# Patient Record
Sex: FEMALE | NOT HISPANIC OR LATINO | Employment: FULL TIME | URBAN - METROPOLITAN AREA
[De-identification: names, ages, dates, MRNs, and addresses within clinical notes are randomized per-mention and may not be internally consistent; named-entity substitution may affect disease eponyms.]

---

## 2018-05-30 ENCOUNTER — OFFICE VISIT (OUTPATIENT)
Dept: FAMILY MEDICINE CLINIC | Facility: CLINIC | Age: 48
End: 2018-05-30

## 2018-05-30 VITALS
HEIGHT: 64 IN | TEMPERATURE: 97.9 F | BODY MASS INDEX: 31.92 KG/M2 | RESPIRATION RATE: 16 BRPM | DIASTOLIC BLOOD PRESSURE: 80 MMHG | HEART RATE: 68 BPM | SYSTOLIC BLOOD PRESSURE: 120 MMHG | WEIGHT: 187 LBS

## 2018-05-30 DIAGNOSIS — E03.9 ACQUIRED HYPOTHYROIDISM: ICD-10-CM

## 2018-05-30 DIAGNOSIS — G56.22 ULNAR NEUROPATHY OF LEFT UPPER EXTREMITY: ICD-10-CM

## 2018-05-30 DIAGNOSIS — M77.02 GOLFER'S ELBOW, LEFT: Primary | ICD-10-CM

## 2018-05-30 PROCEDURE — 99213 OFFICE O/P EST LOW 20 MIN: CPT | Performed by: FAMILY MEDICINE

## 2018-05-30 RX ORDER — PREDNISONE 20 MG/1
40 TABLET ORAL DAILY
Qty: 10 TABLET | Refills: 0 | Status: SHIPPED | OUTPATIENT
Start: 2018-05-30 | End: 2020-09-29

## 2018-05-30 NOTE — PROGRESS NOTES
Subjective   Tonja Macias is a 47 y.o. female.     History of Present Illness     For the last 2-3 weeks she has had some soreness in her left elbow on the outside  Had some tingling on her pinkie and ring finger  Sometimes would wakt her up  She then played golf and it was worse, gripping makes this worse        Review of Systems   Constitutional: Negative.        Objective   Physical Exam   Constitutional: She appears well-developed and well-nourished. No distress.   Cardiovascular: Normal rate, regular rhythm and normal heart sounds.    Pulmonary/Chest: Effort normal and breath sounds normal.   Musculoskeletal:        Arms:  Neurological:   Negative tinnel and phalen on left   Psychiatric: She has a normal mood and affect. Her behavior is normal.   Nursing note and vitals reviewed.      Assessment/Plan   Tonja was seen today for arm pain.    Diagnoses and all orders for this visit:    Golfer's elbow, left    Ulnar neuropathy of left upper extremity  -     predniSONE (DELTASONE) 20 MG tablet; Take 2 tablets by mouth Daily.    Acquired hypothyroidism  -     TSH      Home stretches, tennis elbow brace, steroids and time.  Consider injection  Sounds like she may have some ulnar neuropathy.  Hoping the steroids calm this down, consider EMG in future INB

## 2018-05-31 LAB — TSH SERPL DL<=0.005 MIU/L-ACNC: 1.68 MIU/ML (ref 0.35–5.35)

## 2019-07-22 ENCOUNTER — TRANSCRIBE ORDERS (OUTPATIENT)
Dept: ADMINISTRATIVE | Facility: HOSPITAL | Age: 49
End: 2019-07-22

## 2019-07-22 DIAGNOSIS — Z12.31 VISIT FOR SCREENING MAMMOGRAM: Primary | ICD-10-CM

## 2019-08-27 ENCOUNTER — HOSPITAL ENCOUNTER (OUTPATIENT)
Dept: MAMMOGRAPHY | Facility: HOSPITAL | Age: 49
Discharge: HOME | End: 2019-08-27
Admitting: OBSTETRICS & GYNECOLOGY

## 2019-08-27 DIAGNOSIS — Z12.31 VISIT FOR SCREENING MAMMOGRAM: ICD-10-CM

## 2019-08-27 PROCEDURE — 77067 SCR MAMMO BI INCL CAD: CPT

## 2019-08-27 PROCEDURE — 77063 BREAST TOMOSYNTHESIS BI: CPT | Performed by: RADIOLOGY

## 2019-08-27 PROCEDURE — 77067 SCR MAMMO BI INCL CAD: CPT | Performed by: RADIOLOGY

## 2019-08-27 PROCEDURE — 77063 BREAST TOMOSYNTHESIS BI: CPT

## 2019-09-19 ENCOUNTER — HOSPITAL ENCOUNTER (OUTPATIENT)
Dept: MAMMOGRAPHY | Facility: HOSPITAL | Age: 49
Discharge: HOME | End: 2019-09-19
Admitting: RADIOLOGY

## 2019-09-19 DIAGNOSIS — R92.8 ABNORMAL MAMMOGRAM: ICD-10-CM

## 2019-09-19 PROCEDURE — 77065 DX MAMMO INCL CAD UNI: CPT

## 2019-09-19 PROCEDURE — G0279 TOMOSYNTHESIS, MAMMO: HCPCS

## 2019-09-19 PROCEDURE — 77061 BREAST TOMOSYNTHESIS UNI: CPT | Performed by: RADIOLOGY

## 2019-09-19 PROCEDURE — 77065 DX MAMMO INCL CAD UNI: CPT | Performed by: RADIOLOGY

## 2020-09-22 ENCOUNTER — TRANSCRIBE ORDERS (OUTPATIENT)
Dept: ADMINISTRATIVE | Facility: HOSPITAL | Age: 50
End: 2020-09-22

## 2020-09-22 DIAGNOSIS — Z12.31 VISIT FOR SCREENING MAMMOGRAM: Primary | ICD-10-CM

## 2020-09-29 ENCOUNTER — HOSPITAL ENCOUNTER (OUTPATIENT)
Dept: MAMMOGRAPHY | Facility: HOSPITAL | Age: 50
Discharge: HOME | End: 2020-09-29
Admitting: OBSTETRICS & GYNECOLOGY

## 2020-09-29 ENCOUNTER — OFFICE VISIT (OUTPATIENT)
Dept: OBSTETRICS AND GYNECOLOGY | Facility: CLINIC | Age: 50
End: 2020-09-29

## 2020-09-29 VITALS
BODY MASS INDEX: 32.27 KG/M2 | SYSTOLIC BLOOD PRESSURE: 120 MMHG | DIASTOLIC BLOOD PRESSURE: 74 MMHG | WEIGHT: 189 LBS | TEMPERATURE: 97.3 F | HEIGHT: 64 IN

## 2020-09-29 DIAGNOSIS — Z12.31 VISIT FOR SCREENING MAMMOGRAM: ICD-10-CM

## 2020-09-29 DIAGNOSIS — Z00.00 ANNUAL PHYSICAL EXAM: Primary | ICD-10-CM

## 2020-09-29 PROCEDURE — 77063 BREAST TOMOSYNTHESIS BI: CPT

## 2020-09-29 PROCEDURE — 77067 SCR MAMMO BI INCL CAD: CPT

## 2020-09-29 PROCEDURE — 77067 SCR MAMMO BI INCL CAD: CPT | Performed by: RADIOLOGY

## 2020-09-29 PROCEDURE — 99396 PREV VISIT EST AGE 40-64: CPT | Performed by: NURSE PRACTITIONER

## 2020-09-29 PROCEDURE — 77063 BREAST TOMOSYNTHESIS BI: CPT | Performed by: RADIOLOGY

## 2020-09-29 NOTE — PROGRESS NOTES
GYN Annual Exam     CC - Here for annual exam.        HPI  Tonja Macias is a 49 y.o. female, , who presents for annual well woman exam. Patient's last menstrual period was 2020 (exact date)..  Periods are regular every 25-35 days, lasting 3 days. .  Dysmenorrhea:none.  Patient reports problems with: none.  Partner Status: Marital Status: .  New Partners since last visit: no.  Desires STD Screening: no. The patient had an annual exam one year ago. Her last pap smear was one year ago and negative. She had her screening mammogram this morning, results pending. Her periods are regular and light flow. She uses vasectomy for birth control. She has no complaints today.    Additional OB/GYN History   Current contraception: contraceptive methods: Vasectomy   Desires to: continue contraception  Last Pap : 2019 negative  Last Completed Pap Smear       Status Date      PAP SMEAR No completions recorded        History of abnormal Pap smear: no  Family history of uterine, colon, breast, or ovarian cancer: no  Performs monthly Self-Breast Exam: yes  Last mammogram: 2020 pending  Last Completed Mammogram     Patient has no health maintenance due at this time         Exercises Regularly: yes  Feelings of Anxiety or Depression: no  Tobacco Usage?: No   OB History        1    Para   1    Term   1            AB        Living   2       SAB        TAB        Ectopic        Molar        Multiple   1    Live Births   1                Health Maintenance   Topic Date Due   • Annual Gynecologic Pelvic and Breast Exam  1971   • COLONOSCOPY  1971   • ANNUAL PHYSICAL  1974   • TDAP/TD VACCINES (1 - Tdap) 1990   • HEPATITIS C SCREENING  2016   • PAP SMEAR  2016   • INFLUENZA VACCINE  2020   • Pneumococcal Vaccine 0-64  Aged Out       The additional following portions of the patient's history were reviewed and updated as appropriate: allergies, current  "medications, past family history, past medical history, past social history, past surgical history and problem list.    Review of Systems   Constitutional: Negative.    Gastrointestinal: Negative.    Genitourinary: Negative.    Psychiatric/Behavioral: Negative.      All other systems reviewed and are negative.     I have reviewed and agree with the HPI, ROS, and historical information as entered above. Verena Hardin, APRN    Objective   /74   Temp 97.3 °F (36.3 °C)   Ht 162.6 cm (64\")   Wt 85.7 kg (189 lb)   LMP 09/17/2020 (Exact Date) Comment: denies pregnancy  Breastfeeding No   BMI 32.44 kg/m²     Physical Exam  Vitals signs and nursing note reviewed. Exam conducted with a chaperone present.   Constitutional:       Appearance: Normal appearance.   Cardiovascular:      Rate and Rhythm: Normal rate and regular rhythm.   Chest:      Breasts:         Right: Normal.         Left: Normal.      Comments: Had breast reduction 11/2019  Genitourinary:     Vagina: Normal.      Cervix: Normal.      Uterus: Normal.       Adnexa: Right adnexa normal and left adnexa normal.      Rectum: Normal.   Neurological:      Mental Status: She is alert.            Assessment and Plan    Problem List Items Addressed This Visit     None      Visit Diagnoses     Annual physical exam    -  Primary    Relevant Orders    Pap IG, Rfx HPV ASCU          1. GYN annual well woman exam.   2. Reviewed monthly self breast exams.  Instructed to call with lumps, pain, or breast discharge.    3. Recommended use of Vitamin D replacement and getting adequate calcium in her diet. (1500mg)  4. Reviewed exercise as a preventative health measures.   5. Reccommended Flu Vaccine in Fall of each year.  6. RTC in 1 year or PRN with problems.   7. She had screening mammogram this am    Verena Hardin, APRN  09/29/2020  "

## 2020-10-13 DIAGNOSIS — Z00.00 ANNUAL PHYSICAL EXAM: ICD-10-CM

## 2021-09-07 ENCOUNTER — TRANSCRIBE ORDERS (OUTPATIENT)
Dept: ADMINISTRATIVE | Facility: HOSPITAL | Age: 51
End: 2021-09-07

## 2021-09-07 DIAGNOSIS — Z12.31 VISIT FOR SCREENING MAMMOGRAM: Primary | ICD-10-CM

## 2021-11-01 ENCOUNTER — HOSPITAL ENCOUNTER (OUTPATIENT)
Dept: MAMMOGRAPHY | Facility: HOSPITAL | Age: 51
Discharge: HOME | End: 2021-11-01
Admitting: OBSTETRICS & GYNECOLOGY

## 2021-11-01 DIAGNOSIS — Z12.31 VISIT FOR SCREENING MAMMOGRAM: ICD-10-CM

## 2021-11-01 PROCEDURE — 77063 BREAST TOMOSYNTHESIS BI: CPT | Performed by: RADIOLOGY

## 2021-11-01 PROCEDURE — 77067 SCR MAMMO BI INCL CAD: CPT

## 2021-11-01 PROCEDURE — 77067 SCR MAMMO BI INCL CAD: CPT | Performed by: RADIOLOGY

## 2021-11-01 PROCEDURE — 77063 BREAST TOMOSYNTHESIS BI: CPT

## 2023-09-28 ENCOUNTER — TRANSCRIBE ORDERS (OUTPATIENT)
Dept: ADMINISTRATIVE | Facility: HOSPITAL | Age: 53
End: 2023-09-28
Payer: COMMERCIAL

## 2023-09-28 DIAGNOSIS — Z12.31 SCREENING MAMMOGRAM FOR BREAST CANCER: Primary | ICD-10-CM

## 2023-10-27 ENCOUNTER — HOSPITAL ENCOUNTER (OUTPATIENT)
Dept: MAMMOGRAPHY | Facility: HOSPITAL | Age: 53
Discharge: HOME | End: 2023-10-27
Admitting: OBSTETRICS & GYNECOLOGY
Payer: COMMERCIAL

## 2023-10-27 DIAGNOSIS — Z12.31 SCREENING MAMMOGRAM FOR BREAST CANCER: ICD-10-CM

## 2023-10-27 PROCEDURE — 77063 BREAST TOMOSYNTHESIS BI: CPT

## 2023-10-27 PROCEDURE — 77067 SCR MAMMO BI INCL CAD: CPT

## 2024-09-16 ENCOUNTER — TRANSCRIBE ORDERS (OUTPATIENT)
Dept: OBSTETRICS AND GYNECOLOGY | Facility: CLINIC | Age: 54
End: 2024-09-16
Payer: COMMERCIAL

## 2024-09-16 DIAGNOSIS — Z12.31 ENCOUNTER FOR SCREENING MAMMOGRAM FOR BREAST CANCER: Primary | ICD-10-CM

## 2024-10-15 LAB
NCCN CRITERIA FLAG: NORMAL
TYRER CUZICK SCORE: 8.4

## 2024-10-28 ENCOUNTER — HOSPITAL ENCOUNTER (OUTPATIENT)
Dept: MAMMOGRAPHY | Facility: HOSPITAL | Age: 54
Discharge: HOME | End: 2024-10-28
Payer: COMMERCIAL

## 2024-10-28 DIAGNOSIS — Z12.31 ENCOUNTER FOR SCREENING MAMMOGRAM FOR BREAST CANCER: ICD-10-CM

## 2024-11-19 ENCOUNTER — OFFICE VISIT (OUTPATIENT)
Dept: OBSTETRICS AND GYNECOLOGY | Facility: CLINIC | Age: 54
End: 2024-11-19
Payer: COMMERCIAL

## 2024-11-19 VITALS
SYSTOLIC BLOOD PRESSURE: 118 MMHG | BODY MASS INDEX: 26.53 KG/M2 | WEIGHT: 155.4 LBS | HEIGHT: 64 IN | DIASTOLIC BLOOD PRESSURE: 70 MMHG

## 2024-11-19 DIAGNOSIS — N92.0 MENORRHAGIA WITH REGULAR CYCLE: ICD-10-CM

## 2024-11-19 DIAGNOSIS — N92.1 BREAKTHROUGH BLEEDING: ICD-10-CM

## 2024-11-19 DIAGNOSIS — Z01.419 WOMEN'S ANNUAL ROUTINE GYNECOLOGICAL EXAMINATION: Primary | ICD-10-CM

## 2024-11-19 RX ORDER — DROSPIRENONE 4 MG/1
1 TABLET, FILM COATED ORAL DAILY
Qty: 84 TABLET | Refills: 4 | Status: SHIPPED | OUTPATIENT
Start: 2024-11-19

## 2024-11-19 NOTE — PROGRESS NOTES
"          Chief Complaint   Patient presents with    Menstrual Problem   Annual gyn exam       Subjective   HPI  Tonja Macias is a 54 y.o. female, , Patient's last menstrual period was 2024 (approximate).. She presents for initial evaluation of irregular periods. She reports her cycles occur every 25-35 days , lasting 3-4 days. The flow varies.  She reports at time, she will have to change a pad/tampon every hour.  She reports that following her \"normal\" period, she will start spotting two days later for 2-3 days.  The menstrual problem began  3-4 months ago. Prior to today's visit, the patient has has not been evaluated. The patient reports additional symptoms as  decreased energy .      US was not done today. Patient reports that she had a ct done x 1 year ago for screening, and it showed fibroids. Her pcp told her it was nothing to be concerned with unless she began to have changes in her periods. Vasectomy for contraception.    Thromboembolic Disease: none  History of hypertension: no  History of migraines: no  Tobacco Usage?: No     Additional OB/GYN History   Last Pap : - negative  Last Completed Pap Smear       This patient has no relevant Health Maintenance data.              Current Outpatient Medications:     Drospirenone (Slynd) 4 MG tablet, Take 1 tablet by mouth Daily., Disp: 84 tablet, Rfl: 4     Past Medical History:   Diagnosis Date    A-fib     Screening breast examination     self admits        Past Surgical History:   Procedure Laterality Date    ABDOMINOPLASTY  2024    APPENDECTOMY       SECTION      REDUCTION MAMMAPLASTY  2019         The additional following portions of the patient's history were reviewed and updated as appropriate: allergies, current medications, past family history, past medical history, past social history, past surgical history, and problem list.    Review of Systems   Constitutional:  Positive for fatigue.   Respiratory: Negative.   " "  Cardiovascular: Negative.    Gastrointestinal: Negative.    Genitourinary:  Positive for menstrual problem.       I have reviewed and agree with the HPI, ROS, and historical information as entered above. Kenyetta Lillykman, APRN      Objective   /70   Ht 162.6 cm (64\")   Wt 70.5 kg (155 lb 6.4 oz)   LMP 11/12/2024 (Approximate)   BMI 26.67 kg/m²     Physical Exam  Constitutional:       Appearance: Normal appearance.   Neck:      Thyroid: No thyroid mass or thyromegaly.   Pulmonary:      Effort: Pulmonary effort is normal.   Chest:      Chest wall: No mass.   Breasts:     Right: Normal. No inverted nipple, mass, nipple discharge or skin change.      Left: Normal. No inverted nipple, mass, nipple discharge or skin change.   Abdominal:      General: There is no distension.      Palpations: Abdomen is soft. There is no mass.      Tenderness: There is no abdominal tenderness.      Hernia: No hernia is present.   Genitourinary:     General: Normal vulva.      Labia:         Right: No rash.         Left: No rash.       Vagina: Normal.      Cervix: Friability present. No cervical motion tenderness or lesion.      Uterus: Normal.       Adnexa: Right adnexa normal and left adnexa normal.        Right: No mass or tenderness.          Left: No mass or tenderness.              Comments: Possible polyp posterior cervix, not pedunculated, flush with cervix so unable to easily remove.   Neurological:      Mental Status: She is alert.         Assessment & Plan     Assessment     Problem List Items Addressed This Visit          Genitourinary and Reproductive     Menorrhagia with regular cycle    Overview     Incidental finding of fibroids noted on CT 2023. No issue until last 4 months or so with 1 day of heavy bleeding. Known iron deficiency anemia. Thyroid was normal with PCP 6 months ago. RTO U/S. Will start slynd         Relevant Orders    US Non-ob Transvaginal    Breakthrough bleeding    Overview     X 3-4 months for " approx 1 week following menses         Relevant Orders    US Non-ob Transvaginal     Other Visit Diagnoses       Women's annual routine gynecological examination    -  Primary          Possible polyp noted, unable to remove using ring forcep, will have OP evaluate at next visit.     Plan     Call for heavy bleeding  Return to office for U/S for Eval  Start slynd  Follow Up: Return in about 2 weeks (around 12/3/2024) for U/S Susan BTB, fibroids, possible cervical polyp.        Kenyetta Banks, APRN  11/19/2024

## 2024-11-20 LAB — REF LAB TEST METHOD: NORMAL

## 2024-12-03 ENCOUNTER — OFFICE VISIT (OUTPATIENT)
Dept: OBSTETRICS AND GYNECOLOGY | Facility: CLINIC | Age: 54
End: 2024-12-03
Payer: COMMERCIAL

## 2024-12-03 VITALS
WEIGHT: 155 LBS | RESPIRATION RATE: 18 BRPM | SYSTOLIC BLOOD PRESSURE: 120 MMHG | BODY MASS INDEX: 26.46 KG/M2 | DIASTOLIC BLOOD PRESSURE: 84 MMHG | HEIGHT: 64 IN

## 2024-12-03 DIAGNOSIS — N92.0 MENORRHAGIA WITH REGULAR CYCLE: Primary | ICD-10-CM

## 2024-12-03 DIAGNOSIS — D62 ANEMIA DUE TO ACUTE BLOOD LOSS: ICD-10-CM

## 2024-12-03 DIAGNOSIS — D25.1 FIBROIDS, INTRAMURAL: ICD-10-CM

## 2024-12-03 PROCEDURE — 99214 OFFICE O/P EST MOD 30 MIN: CPT | Performed by: OBSTETRICS & GYNECOLOGY

## 2024-12-03 RX ORDER — TRANEXAMIC ACID 650 MG/1
TABLET ORAL
Qty: 30 TABLET | Refills: 12 | Status: SHIPPED | OUTPATIENT
Start: 2024-12-03

## 2024-12-03 RX ORDER — FERRIC MALTOL 30 MG/1
30 CAPSULE ORAL DAILY
Qty: 60 CAPSULE | Refills: 4 | Status: SHIPPED | OUTPATIENT
Start: 2024-12-03

## 2024-12-03 NOTE — PROGRESS NOTES
Chief Complaint   Patient presents with    abnormal uterine bleeding    Fibroids       Subjective   HPI  Tonja Mcaias is a 54 y.o. female, . Her last LMP was Patient's last menstrual period was 2024 (approximate).. who presents for follow up on fibroids, abnormal uterine bleeding.      At her last visit she was treated with  slynd . Since then she reports she is not aware if her symptoms/issue have improved due to not having a period yet.. The patient reports additional symptoms as none.        Additional OB/GYN History     Last Pap :   Last Completed Pap Smear            PAP SMEAR (Every 3 Years) Next due on 2027  LIQUID-BASED PAP SMEAR WITH HPV GENOTYPING REGARDLESS OF INTERPRETATION (KATHY,COR,MAD)                    Last mammogram:   Last Completed Mammogram            Scheduled - MAMMOGRAM (Every 2 Years) Scheduled for 2025      10/27/2023  Mammo Screening Digital Tomosynthesis Bilateral With CAD    2021  Mammo Screening Digital Tomosynthesis Bilateral With CAD    2020  Mammo Screening Digital Tomosynthesis Bilateral With CAD    2019  Mammo Diagnostic Digital Tomosynthesis Right With CAD    2019  Mammo Screening Digital Tomosynthesis Bilateral With CAD    Only the first 5 history entries have been loaded, but more history exists.                    Tobacco Usage?: No   OB History          1    Para   1    Term   1            AB        Living   2         SAB        IAB        Ectopic        Molar        Multiple   1    Live Births   2                  Current Outpatient Medications:     Drospirenone (Slynd) 4 MG tablet, Take 1 tablet by mouth Daily., Disp: 84 tablet, Rfl: 4    Ferric Maltol (ACCRUFeR) 30 MG capsule, Take 1 capsule by mouth Daily., Disp: 60 capsule, Rfl: 4    Tranexamic Acid (Lysteda) 650 MG tablet, 2 tablets by mouth 3 times daily for 5 days, Disp: 30 tablet, Rfl: 12     Past Medical History:  "  Diagnosis Date    A-fib     Screening breast examination     self admits        Past Surgical History:   Procedure Laterality Date    ABDOMINOPLASTY  2024    APPENDECTOMY       SECTION      REDUCTION MAMMAPLASTY  2019    WISDOM TOOTH EXTRACTION         The additional following portions of the patient's history were reviewed and updated as appropriate: allergies, current medications, past family history, past medical history, past social history, past surgical history, and problem list.    Review of Systems    I have reviewed and agree with the HPI, ROS, and historical information as entered above. Nathaniel Davis MD      Objective   /84   Resp 18   Ht 162.6 cm (64.02\")   Wt 70.3 kg (155 lb)   LMP 2024 (Approximate)   BMI 26.59 kg/m²     Physical Exam not done     Assessment & Plan     Assessment     Problem List Items Addressed This Visit          Genitourinary and Reproductive     Menorrhagia with regular cycle - Primary    Overview     Incidental finding of fibroids noted on CT . No issue until last 4 months or so with 1 day of heavy bleeding. Known iron deficiency anemia. Thyroid was normal with PCP 6 months ago. RTO U/S. Will start slynd         Relevant Medications    Tranexamic Acid (Lysteda) 650 MG tablet     Other Visit Diagnoses       Fibroids, intramural        Anemia due to acute blood loss        Relevant Medications    Ferric Maltol (ACCRUFeR) 30 MG capsule            Fibroids  Anemia     Plan     All questions answered.  Will add Accrufer and Lysteda and reasses in 2 months   Return in about 9 weeks (around 2025) for us then .        Nathaniel Davis MD  2024   "

## 2024-12-05 ENCOUNTER — TELEPHONE (OUTPATIENT)
Dept: OBSTETRICS AND GYNECOLOGY | Facility: CLINIC | Age: 54
End: 2024-12-05
Payer: COMMERCIAL

## 2024-12-05 NOTE — TELEPHONE ENCOUNTER
The PA for Accrufer 30mg capsules was denied. - [facility] is going to reach out to the pt to offer a cash price. Her [employer] wanted chart notes or paid claims of High potency iron, ferrous sulfate

## 2025-02-04 ENCOUNTER — TELEPHONE (OUTPATIENT)
Dept: OBSTETRICS AND GYNECOLOGY | Facility: CLINIC | Age: 55
End: 2025-02-04
Payer: COMMERCIAL

## 2025-02-04 ENCOUNTER — OFFICE VISIT (OUTPATIENT)
Dept: OBSTETRICS AND GYNECOLOGY | Facility: CLINIC | Age: 55
End: 2025-02-04
Payer: COMMERCIAL

## 2025-02-04 VITALS
DIASTOLIC BLOOD PRESSURE: 80 MMHG | HEIGHT: 64 IN | WEIGHT: 151 LBS | SYSTOLIC BLOOD PRESSURE: 110 MMHG | BODY MASS INDEX: 25.78 KG/M2

## 2025-02-04 DIAGNOSIS — D25.2 INTRAMURAL AND SUBSEROUS LEIOMYOMA OF UTERUS: ICD-10-CM

## 2025-02-04 DIAGNOSIS — N93.9 ABNORMAL UTERINE BLEEDING (AUB): Primary | ICD-10-CM

## 2025-02-04 DIAGNOSIS — D25.1 INTRAMURAL AND SUBSEROUS LEIOMYOMA OF UTERUS: ICD-10-CM

## 2025-02-04 RX ORDER — MEDROXYPROGESTERONE ACETATE 10 MG
TABLET ORAL
Qty: 42 TABLET | Refills: 3 | Status: SHIPPED | OUTPATIENT
Start: 2025-02-04

## 2025-02-04 NOTE — PROGRESS NOTES
Chief Complaint   Patient presents with    endometrial biopsy       Subjective   HPI  Tonja Macias is a 54 y.o. female, . Her last LMP was No LMP recorded. Patient is perimenopausal.. who presents for follow up on breakthrough bleeding.      At her last visit she was treated with  none . Since then she reports her symptoms/issue has remained unchanged. The patient reports additional symptoms as none.        Additional OB/GYN History     Last Pap :   Last Completed Pap Smear            PAP SMEAR (Every 3 Years) Next due on 2027  LIQUID-BASED PAP SMEAR WITH HPV GENOTYPING REGARDLESS OF INTERPRETATION (KATHY,COR,MAD)                    Last mammogram:   Last Completed Mammogram            Ordered - MAMMOGRAM (Every 2 Years) Ordered on 10/28/2024      10/27/2023  Mammo Screening Digital Tomosynthesis Bilateral With CAD    2021  Mammo Screening Digital Tomosynthesis Bilateral With CAD    2020  Mammo Screening Digital Tomosynthesis Bilateral With CAD    2019  Mammo Diagnostic Digital Tomosynthesis Right With CAD    2019  Mammo Screening Digital Tomosynthesis Bilateral With CAD    Only the first 5 history entries have been loaded, but more history exists.                    Tobacco Usage?: No   OB History          1    Para   1    Term   1            AB        Living   2         SAB        IAB        Ectopic        Molar        Multiple   1    Live Births   2                  Current Outpatient Medications:     Drospirenone (Slynd) 4 MG tablet, Take 1 tablet by mouth Daily., Disp: 84 tablet, Rfl: 4    Ferric Maltol (ACCRUFeR) 30 MG capsule, Take 1 capsule by mouth Daily., Disp: 60 capsule, Rfl: 4    Tranexamic Acid (Lysteda) 650 MG tablet, 2 tablets by mouth 3 times daily for 5 days, Disp: 30 tablet, Rfl: 12     Past Medical History:   Diagnosis Date    A-fib     Screening breast examination     self admits        Past Surgical History:  "  Procedure Laterality Date    ABDOMINOPLASTY  2024    APPENDECTOMY       SECTION      REDUCTION MAMMAPLASTY  2019    WISDOM TOOTH EXTRACTION         The additional following portions of the patient's history were reviewed and updated as appropriate: allergies and current medications.    Review of Systems    I have reviewed and agree with the HPI, ROS, and historical information as entered above. Nathaniel Davis MD      Objective   /80   Ht 162.6 cm (64.02\")   Wt 68.5 kg (151 lb)   BMI 25.90 kg/m²     Physical Exam  Vitals and nursing note reviewed. Exam conducted with a chaperone present.   Constitutional:       Appearance: She is well-developed.   HENT:      Head: Normocephalic and atraumatic.   Neck:      Thyroid: No thyroid mass or thyromegaly.   Cardiovascular:      Rate and Rhythm: Normal rate and regular rhythm.      Heart sounds: No murmur heard.  Pulmonary:      Effort: Pulmonary effort is normal. No retractions.      Breath sounds: Normal breath sounds. No wheezing, rhonchi or rales.   Chest:      Chest wall: No mass or tenderness.   Breasts:     Right: Normal. No mass, nipple discharge, skin change or tenderness.      Left: Normal. No mass, nipple discharge, skin change or tenderness.   Abdominal:      General: Bowel sounds are normal.      Palpations: Abdomen is soft. Abdomen is not rigid. There is no mass.      Tenderness: There is no abdominal tenderness. There is no guarding.      Hernia: No hernia is present. There is no hernia in the left inguinal area.   Genitourinary:     Labia:         Right: No rash, tenderness or lesion.         Left: No rash, tenderness or lesion.       Vagina: Normal. Bleeding present. No vaginal discharge or lesions.      Cervix: No cervical motion tenderness, discharge, lesion or cervical bleeding.      Uterus: Normal. Not enlarged, not fixed and not tender.       Adnexa:         Right: No mass or tenderness.          Left: No mass or tenderness.   "      Rectum: No external hemorrhoid.   Musculoskeletal:      Cervical back: Normal range of motion. No muscular tenderness.   Neurological:      Mental Status: She is alert and oriented to person, place, and time.   Psychiatric:         Behavior: Behavior normal.         Assessment & Plan     Assessment     Problem List Items Addressed This Visit    None  Visit Diagnoses       Abnormal uterine bleeding (AUB)    -  Primary    Intramural and subserous leiomyoma of uterus                Patient has history of fibroids.  Has been on Slynd for the last 2 months however her periods of gone from 5 days and regular monthly to 11 to 12 days and heavier.  Endometrial biopsy was gotten today to 9 cm.  There is 3 fibroids they are relatively stable since December.  The largest is 3.5 cm.  None seem to impinge on the endometrial cavity.    Plan     All questions answered.  Patient is going to stop slynd and we will start Provera 10 mg to take 3 weeks out of each month and have 1 week off for her menses.  She is already on iron.  She does not want to take Lysteda due to a history of atrial fibs.  We offered her an IUD, and ablation is the possible fix as well.  Hysterectomy was offered but she does not want to do that at this time.  We are going to draw labs today to see if she is close to menopause and check her blood level.  I will have her come back in a month.  Return in about 1 month (around 3/4/2025) for Recheck.        Nathaniel Davis MD  02/04/2025

## 2025-02-04 NOTE — PROGRESS NOTES
"     Gynecologic Procedure Note        Endometrial Biopsy      Indications: Tonja Macias is a 54 y.o. , who presents today for evaluation of Menorrhagia. As part of the evaluation, an endometrial biopsy was recommended.  The patient was noted to have Menorrhagia.  Her LMP is No LMP recorded. Patient is perimenopausal. . After being presented with the risk, benefits, and specific detail of the procedure, the patient wished to proceed.  Written consent was obtained from patient.   Urine pregnancy test was Not indicated. Patient does not have an allergy to betadine or shellfish.     Procedure Details     Time out: immediate members of the procedure team and patient agree to the following: correct patient, correct site, correct procedure to be performed. Nathaniel Davis MD      The patient was placed on the table in the dorsal lithotomy position.  She was draped in the appropriate manner.  A speculum was placed in the vagina.  The cervix was visualized and prepped with Betadine.  A tenaculum was placed on the anterior lip of the cervix for traction.  A small plastic 5 mm Pipelle syringe curette was inserted into the cervical canal.  The uterus was sounded to 9 cm's.  A vigorous four quadrant biopsy was performed, removing a medium amount of tissue.  The tissue was placed in Formalin and sent to Pathology.  The patient tolerated the procedure adequately and she reported moderate cramping.  The tenaculum was removed from the cervix and the speculum was removed.  The patient was observed for 10 minutes.           Complications: none.     Procedures endometrial bx     Review of Systems  /80   Ht 162.6 cm (64.02\")   Wt 68.5 kg (151 lb)   BMI 25.90 kg/m²       Plan:  No orders of the defined types were placed in this encounter.      Problem List Items Addressed This Visit    None  Visit Diagnoses       Abnormal uterine bleeding (AUB)    -  Primary    Intramural and subserous leiomyoma of uterus          "           Instructions  Call the office in 5 business days for biopsy results.  Patient instructed to call the office if develops a fever of 100.4 or greater, vaginal bleeding heavier than a period, foul vaginal discharge or pain.     Natahniel Davis MD  02/04/2025

## 2025-02-04 NOTE — TELEPHONE ENCOUNTER
Provider:  DR MALIK    Caller:LAVELL QIU    Phone Number: [phone]     Reason for Call: PATIENT STARTED HER CYCLE LAST NIGHT AND HAS US TODAY IS WONDERING IF SHE CAN STILL COME OR NEEDS TO R/S//HUB COULDN'T REACH OFFICE//SENDING HIGH PRIORITY APPT IS AT 9

## 2025-02-05 ENCOUNTER — TELEPHONE (OUTPATIENT)
Dept: OBSTETRICS AND GYNECOLOGY | Facility: CLINIC | Age: 55
End: 2025-02-05
Payer: COMMERCIAL

## 2025-02-05 LAB
ERYTHROCYTE [DISTWIDTH] IN BLOOD BY AUTOMATED COUNT: 12.2 % (ref 12.3–15.4)
ESTRADIOL SERPL-MCNC: 79.3 PG/ML
FSH SERPL-ACNC: 12.1 MIU/ML
HCT VFR BLD AUTO: 41.4 % (ref 34–46.6)
HGB BLD-MCNC: 13.4 G/DL (ref 12–15.9)
MCH RBC QN AUTO: 30.1 PG (ref 26.6–33)
MCHC RBC AUTO-ENTMCNC: 32.4 G/DL (ref 31.5–35.7)
MCV RBC AUTO: 93 FL (ref 79–97)
PLATELET # BLD AUTO: 241 10*3/MM3 (ref 140–450)
RBC # BLD AUTO: 4.45 10*6/MM3 (ref 3.77–5.28)
REF LAB TEST METHOD: NORMAL
T4 FREE SERPL-MCNC: 1.51 NG/DL (ref 0.92–1.68)
TSH SERPL DL<=0.005 MIU/L-ACNC: 3.33 UIU/ML (ref 0.27–4.2)
WBC # BLD AUTO: 6.4 10*3/MM3 (ref 3.4–10.8)

## 2025-02-05 NOTE — TELEPHONE ENCOUNTER
Caller: Tonja Macias    Relationship: Self    Best call back number: [phone]    What is the best time to reach you: ANYTIME     Do you know the name of the person who called: DEYANIRA

## 2025-03-04 ENCOUNTER — OFFICE VISIT (OUTPATIENT)
Dept: OBSTETRICS AND GYNECOLOGY | Facility: CLINIC | Age: 55
End: 2025-03-04
Payer: COMMERCIAL

## 2025-03-04 VITALS
RESPIRATION RATE: 18 BRPM | HEIGHT: 64 IN | DIASTOLIC BLOOD PRESSURE: 80 MMHG | SYSTOLIC BLOOD PRESSURE: 120 MMHG | WEIGHT: 158 LBS | BODY MASS INDEX: 26.98 KG/M2

## 2025-03-04 DIAGNOSIS — N94.6 DYSMENORRHEA: Primary | ICD-10-CM

## 2025-03-04 DIAGNOSIS — N93.9 ABNORMAL UTERINE BLEEDING (AUB): ICD-10-CM

## 2025-03-04 DIAGNOSIS — D25.1 FIBROIDS, INTRAMURAL: ICD-10-CM

## 2025-03-04 PROCEDURE — 99212 OFFICE O/P EST SF 10 MIN: CPT | Performed by: OBSTETRICS & GYNECOLOGY

## 2025-03-04 RX ORDER — ACARBOSE 100 MG/1
TABLET ORAL
COMMUNITY
Start: 2025-01-16

## 2025-03-04 NOTE — PROGRESS NOTES
Chief Complaint   Patient presents with    Menorrhagia       Subjective   HPI  Tonja Macias is a 54 y.o. female, . Her last LMP was Patient's last menstrual period was 2025 (exact date).. who presents for follow up on menorrhagia.  Patient states she has had 2 periods in 21 days that lasted 5-7 days each.    At her last visit she was treated with Provera and had a negative EMB. Since then she reports her symptoms/issue has remained unchanged. The patient reports additional symptoms as none.        Additional OB/GYN History     Last Pap :   Last Completed Pap Smear            PAP SMEAR (Every 3 Years) Next due on 2027  LIQUID-BASED PAP SMEAR WITH HPV GENOTYPING REGARDLESS OF INTERPRETATION ([facility])                    Last mammogram:   Last Completed Mammogram            Ordered - MAMMOGRAM (Every 2 Years) Ordered on 10/28/2024      10/27/2023  Mammo Screening Digital Tomosynthesis Bilateral With CAD    2021  Mammo Screening Digital Tomosynthesis Bilateral With CAD    2020  Mammo Screening Digital Tomosynthesis Bilateral With CAD    2019  Mammo Diagnostic Digital Tomosynthesis Right With CAD    2019  Mammo Screening Digital Tomosynthesis Bilateral With CAD    Only the first 5 history entries have been loaded, but more history exists.                    Tobacco Usage?: No   OB History          1    Para   1    Term   1            AB        Living   2         SAB        IAB        Ectopic        Molar        Multiple   1    Live Births   2                  Current Outpatient Medications:     acarbose (PRECOSE) 100 MG tablet, TAKE 1 TABLET BY MOUTH EVERY DAY WITH FOOD AS NEEDED, Disp: , Rfl:     Ferric Maltol (ACCRUFeR) 30 MG capsule, Take 1 capsule by mouth Daily., Disp: 60 capsule, Rfl: 4    medroxyPROGESTERone (Provera) 10 MG tablet, Take Provera once a day starting on day 5 of menstrual cycle for 3 weeks., Disp: 42  "tablet, Rfl: 3     Past Medical History:   Diagnosis Date    A-fib     Screening breast examination     self admits        Past Surgical History:   Procedure Laterality Date    ABDOMINOPLASTY  2024    APPENDECTOMY       SECTION      REDUCTION MAMMAPLASTY  2019    WISDOM TOOTH EXTRACTION         The additional following portions of the patient's history were reviewed and updated as appropriate: allergies, current medications, past family history, past medical history, past social history, past surgical history, and problem list.    Review of Systems    I have reviewed and agree with the HPI, ROS, and historical information as entered above. Nathaniel Davis MD      Objective   /80   Resp 18   Ht 162.6 cm (64.02\")   Wt 71.7 kg (158 lb)   LMP 2025 (Exact Date)   BMI 27.11 kg/m²     Physical Exam not done    Assessment & Plan     Assessment     Problem List Items Addressed This Visit    None  Visit Diagnoses       Dysmenorrhea    -  Primary    Abnormal uterine bleeding (AUB)        Fibroids, intramural                Patient periods were slightly irregular and painful and we added progesterone.  This did not work.  Pain and abnormal bleeding got worse.  Endometrial biopsy was negative and we have decided to stop the progesterone use 600 of ibuprofen during each.  And no surgery at this time.  She is 54 and still not menopausal.  Ablation I do not think is the right answer at this time.    Plan     All questions answered.  Will follow her and see how she does.  We mentioned that her menopause may take several years to complete.  She is happy to go back off hormones and see what happens.  Return if symptoms worsen or fail to improve.        Nathaniel Davis MD  2025   "